# Patient Record
Sex: MALE | Race: WHITE | NOT HISPANIC OR LATINO
[De-identification: names, ages, dates, MRNs, and addresses within clinical notes are randomized per-mention and may not be internally consistent; named-entity substitution may affect disease eponyms.]

---
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
## 2018-01-19 ENCOUNTER — MEDICATION RENEWAL (OUTPATIENT)
Age: 18
End: 2018-01-19

## 2018-01-19 ENCOUNTER — RX RENEWAL (OUTPATIENT)
Age: 18
End: 2018-01-19

## 2018-02-20 ENCOUNTER — RX RENEWAL (OUTPATIENT)
Age: 18
End: 2018-02-20

## 2018-04-10 ENCOUNTER — RX RENEWAL (OUTPATIENT)
Age: 18
End: 2018-04-10

## 2018-04-11 ENCOUNTER — MEDICATION RENEWAL (OUTPATIENT)
Age: 18
End: 2018-04-11

## 2018-04-11 ENCOUNTER — APPOINTMENT (OUTPATIENT)
Dept: NEUROLOGY | Facility: CLINIC | Age: 18
End: 2018-04-11

## 2018-04-17 ENCOUNTER — MEDICATION RENEWAL (OUTPATIENT)
Age: 18
End: 2018-04-17

## 2018-04-26 ENCOUNTER — RX RENEWAL (OUTPATIENT)
Age: 18
End: 2018-04-26

## 2018-04-26 DIAGNOSIS — G40.419 OTHER GENERALIZED EPILEPSY AND EPILEPTIC SYNDROMES, INTRACTABLE, W/OUT STATUS EPILEPTICUS: ICD-10-CM

## 2018-05-01 ENCOUNTER — APPOINTMENT (OUTPATIENT)
Dept: NEUROLOGY | Facility: CLINIC | Age: 18
End: 2018-05-01
Payer: MEDICAID

## 2018-05-01 ENCOUNTER — MEDICATION RENEWAL (OUTPATIENT)
Age: 18
End: 2018-05-01

## 2018-05-01 ENCOUNTER — OTHER (OUTPATIENT)
Age: 18
End: 2018-05-01

## 2018-05-01 VITALS
BODY MASS INDEX: 25.84 KG/M2 | SYSTOLIC BLOOD PRESSURE: 115 MMHG | WEIGHT: 195 LBS | HEART RATE: 81 BPM | DIASTOLIC BLOOD PRESSURE: 63 MMHG | HEIGHT: 73 IN

## 2018-05-01 PROCEDURE — 99214 OFFICE O/P EST MOD 30 MIN: CPT

## 2018-05-01 RX ORDER — CLOBAZAM 10 MG/1
10 TABLET ORAL
Qty: 6 | Refills: 0 | Status: DISCONTINUED | COMMUNITY
Start: 2017-05-12 | End: 2018-05-01

## 2018-05-01 RX ORDER — DIVALPROEX SODIUM 500 1/1
500 TABLET, EXTENDED RELEASE ORAL
Qty: 90 | Refills: 1 | Status: DISCONTINUED | COMMUNITY
Start: 2017-05-05 | End: 2018-05-01

## 2018-06-01 ENCOUNTER — MEDICATION RENEWAL (OUTPATIENT)
Age: 18
End: 2018-06-01

## 2018-06-29 ENCOUNTER — MEDICATION RENEWAL (OUTPATIENT)
Age: 18
End: 2018-06-29

## 2018-08-02 PROBLEM — G43.509: Chronic | Status: ACTIVE | Noted: 2017-03-07

## 2018-08-02 PROBLEM — G40.909 EPILEPSY, UNSPECIFIED, NOT INTRACTABLE, WITHOUT STATUS EPILEPTICUS: Chronic | Status: ACTIVE | Noted: 2017-03-07

## 2018-08-02 PROBLEM — L70.9 ACNE, UNSPECIFIED: Chronic | Status: ACTIVE | Noted: 2017-03-07

## 2018-08-06 ENCOUNTER — APPOINTMENT (OUTPATIENT)
Dept: ORTHOPEDIC SURGERY | Facility: CLINIC | Age: 18
End: 2018-08-06

## 2019-02-18 ENCOUNTER — TRANSCRIPTION ENCOUNTER (OUTPATIENT)
Age: 19
End: 2019-02-18

## 2019-08-05 ENCOUNTER — APPOINTMENT (OUTPATIENT)
Dept: ORTHOPEDIC SURGERY | Facility: CLINIC | Age: 19
End: 2019-08-05

## 2019-11-20 ENCOUNTER — APPOINTMENT (OUTPATIENT)
Dept: NEUROLOGY | Facility: CLINIC | Age: 19
End: 2019-11-20
Payer: COMMERCIAL

## 2019-11-20 VITALS
HEIGHT: 73 IN | HEART RATE: 64 BPM | DIASTOLIC BLOOD PRESSURE: 63 MMHG | WEIGHT: 195 LBS | BODY MASS INDEX: 25.84 KG/M2 | SYSTOLIC BLOOD PRESSURE: 128 MMHG

## 2019-11-20 PROCEDURE — 99214 OFFICE O/P EST MOD 30 MIN: CPT

## 2019-11-20 RX ORDER — CLONAZEPAM 0.25 MG/1
0.25 TABLET, ORALLY DISINTEGRATING ORAL
Qty: 10 | Refills: 0 | Status: ACTIVE | COMMUNITY
Start: 2019-11-20 | End: 1900-01-01

## 2019-11-20 RX ORDER — LAMOTRIGINE 200 MG/1
200 TABLET, EXTENDED RELEASE ORAL
Qty: 60 | Refills: 5 | Status: ACTIVE | COMMUNITY
Start: 2019-11-20 | End: 1900-01-01

## 2019-11-20 RX ORDER — CLOBAZAM 10 MG/1
10 TABLET ORAL
Qty: 180 | Refills: 0 | Status: DISCONTINUED | COMMUNITY
Start: 2018-05-01 | End: 2019-11-20

## 2019-11-21 NOTE — PHYSICAL EXAM
[Oriented To Time, Place, And Person] : oriented to person, place, and time [Impaired Insight] : insight and judgment were intact [Affect] : the affect was normal [Person] : oriented to person [Place] : oriented to place [Time] : oriented to time [Concentration Intact] : normal concentrating ability [Visual Intact] : visual attention was ~T not ~L decreased [Repeating Phrases] : no difficulty repeating a phrase [Naming Objects] : no difficulty naming common objects [Writing A Sentence] : no difficulty writing a sentence [Fluency] : fluency intact [Comprehension] : comprehension intact [Reading] : reading intact [Past History] : adequate knowledge of personal past history [I: Normal Smell] : smell intact bilaterally [Cranial Nerves Optic (II)] : visual acuity intact bilaterally,  visual fields full to confrontation, pupils equal round and reactive to light [Cranial Nerves Oculomotor (III)] : extraocular motion intact [Cranial Nerves Trigeminal (V)] : facial sensation intact symmetrically [Cranial Nerves Facial (VII)] : face symmetrical [Cranial Nerves Vestibulocochlear (VIII)] : hearing was intact bilaterally [Cranial Nerves Glossopharyngeal (IX)] : tongue and palate midline [Cranial Nerves Accessory (XI - Cranial And Spinal)] : head turning and shoulder shrug symmetric [Cranial Nerves Hypoglossal (XII)] : there was no tongue deviation with protrusion [Motor Tone] : muscle tone was normal in all four extremities [Motor Strength] : muscle strength was normal in all four extremities [Involuntary Movements] : no involuntary movements were seen [No Muscle Atrophy] : normal bulk in all four extremities [Sensation Tactile Decrease] : light touch was intact [Balance] : balance was intact [2+] : Ankle jerk left 2+ [Sclera] : the sclera and conjunctiva were normal [Extraocular Movements] : extraocular movements were intact [Optic Disc Abnormality] : the optic disc were normal in size and color [PERRL With Normal Accommodation] : pupils were equal in size, round, reactive to light, with normal accommodation [Neck Appearance] : the appearance of the neck was normal [Hearing Threshold Finger Rub Not Bertie] : hearing was normal [Outer Ear] : the ears and nose were normal in appearance [Neck Cervical Mass (___cm)] : no neck mass was observed [No CVA Tenderness] : no ~M costovertebral angle tenderness [Edema] : there was no peripheral edema [Musculoskeletal - Swelling] : no joint swelling seen [Abnormal Walk] : normal gait [Skin Color & Pigmentation] : normal skin color and pigmentation [FreeTextEntry1] : MS: Awake, alert, oriented x3; no aphasia, no dysarthria, follows commands past midline.\par CN: PERRLA; no papilledema, EOMI; VFF; no facial asymmetry; V1-3; palate and tongue midline.\par Motor: 5/5  UE and LE proximally and distally\par Reflexes: 2+throughout\par Sensory: intact to  LT/PP/2x stim\par Cerebellar: no dysmetria F-N/H-S\par Toes: downgoing\par Gait/tandem: normal\par Romberg: negative.  [Span Intact] : the attention span was decreased [Past-pointing] : there was no past-pointing [Tremor] : no tremor present [Plantar Reflex Right Only] : normal on the right [Plantar Reflex Left Only] : normal on the left

## 2019-11-21 NOTE — ASSESSMENT
[FreeTextEntry1] : update: no seizures since last office visit \par inpatient VEEG March 2017 Generalized spike and wave - dc'd Keppra and started Divlaproex ER 500mg BID while in hospital\par \par 16 yo young man with hx of migraines (w/visual auras) and multiple concussions, presenting with at least two episodes of generalized seizures. Possible causes include primary (genetic) generalized epilepsy in light of father with hx of epilepsy, although rather late onset for father.  Other causes may be post concussive epilepsy in light of hx of head injury and concussion in lacrosse and football.  Normal neurological exam and MRI brain do not suggest anatomical cause (ie ischemic or mass)\par \par -vEEG in EMU setting with medication withdrawal (3-4 days) to determine if partial vs generalized  to aid in determining choice for new AED as side effects from Keppra are not tolerable.  EMU vEEG pending auth.  Patient knows risks of EMU admission.\par -Patient informed of [facility] driving regulations prohibiting driving for one year unless seizure free.  Patient and mother cautioned against activities during which loss of consciousness may result in harm to self or others, including but not limited to swimming, driving, other.

## 2019-11-21 NOTE — HISTORY OF PRESENT ILLNESS
[FreeTextEntry1] :  ***UPDATE:11/20/19\par \par Patient has been seeing Shimon Choi MD (but MD relocated to [place])\par \par Now on Lamictal 200mg BID\par He is working a s a professional \par \par Currently a student at [employer] has a 4.0 planning to switch  to [employer]\par \par Inquired about Clonazepam at night (very high strung at night and get sleep (racing thoughts)\par He has used prn in the past and has helped\par \par \par \par update: no seizures since last visit\par inpatient VEEG in March 2017 showed generalized spike and wave- Keppra was dc'd and Divalproex ER 500mg BID started while in hospital\par patient feels that he feels better on Divalproex as far as the anxiety he had on Keppra but unclear if it is helping his seizures\par \par 16 yo young man with hx of migraines (w/visual auras) and multiple concussions, presenting for new visit for seizure. Patient is accompanied by mother who provides supplementary history.   First seizure occurred last year near [facility]. While walking patient felt disoriented, light headed and sweaty and stumbled.  He does not remember walking out, however was told by his mother that he had a seizure on the driveway. He sustained a nasal and wrist fracture.  No tongue biting or incontinence. December 23, 2016 was the second episode in the setting of drinking alcohol the night prior, with normal sleep duration.  He only recalls falling down the basement stairs, and was found by his mother.  He went to the hospital in the [facility] both times.  The first time, he was prescribed Keppra 500mg BID without testing.  However he was not compliant as he noticed increased anxiety and mood swings.  The second time he went to [facility] with normal CT scan and MRI, but no EEG.  (Though he did have an routine eeg last year for workup for his migraines, which was normal). While at [facility] mother witnessed her son seizing with eye deviation upward and UE shaking.  His Keppra was increased from 500 BID to 750 BID. \par \par Furthermore, mother endorses that he has been falling out of bed.  Mother has found him on the floor in the morning twice. Patient endorse that he has a hard time falling asleep due to lots of anxiety since starting Keppra.  He He does endorse some odd odor or "weird scent" he could not quite describe a few times.  Denies any upper GI rising sensation.  He denies any staring spells. He does not swim.  He is currently not driving.  He does go to the gym with weight lifting, no treadmill, and takes protein shakes with creatine.  He does hydrate with water. He does not drink coffee. \par \par He endorses many concussions in the setting of playing lacrosse and football.\par \par He lives with his mother and sister and brother.  He has had to take a leave of absence from college due to his seizures. \par \par Patient has family hx of epilepsy-father and maternal grandmother after a MVA.  Father had first seizure at age 28.  Father also played football.  His seizures were always provoked by sleep deprivation, but he was never worked up neurologically. \par \par PMHx: none\par FHx: Father with seizures, grandmother with seizures (maternal)\par SocHx: see above\par Meds: LEV 750mg bid\par All: NKDA

## 2019-11-21 NOTE — DISCUSSION/SUMMARY
[Safety Recommendations] : The patient was advised in regards to the risk of seizures and general seizure safety recommendations including not to be bathing alone, climbing to high places and operating heavy machinery. [Risks Associated with Driving/[place] Law] : As per my usual protocol, the patient was advised in regards to risks and driving privileges associated with the [place] Guidelines.  [Compliance with Medications] : The importance of compliance with medications was reinforced. [Medication Side Effects] : High frequency and serious potential medication adverse effects were reviewed with the patient, including but not exclusive to psychiatric effects.  Information sheets on medication side effects were made available to the patient in our clinic.  The patient or advocate agrees to notify us for any concerns. [Sleep Hygiene/Sleep Disruption Risks] : Sleep hygiene and the risks of sleep disruption were discussed. [Risk of Death] : Risk of death associated with seizures / SUDEP was discussed. [EMU Consent:] : As per our usual protocol, staff discussed video EEG monitoring for the purpose of diagnosis, treatment decisions, teaching, research or publication (if de-identified).  Patient aware recording is continuous (24hrs/day), that they may be under constant observation (with exceptions), and that portions of the video EEG will be stored digitally.  In some cases AEDs may be adjusted to allow for observable seizure activity. The anticipated benefits of the study, the foreseeable risks associated with the procedure including experiencing seizures, which could be more severe than usual. The risk from seizures includes falls, fractures, muscle injury, dental injury, postictal psychiatric symptoms, and heart rate or breathing abnormalities. There will be a risk of experiencing skin irritation or breakdown from the electrodes being placed on the skin.  [FreeTextEntry1] : 17 year old with hx of numerous concussions and first seizure last year\par \par \par Plan\par Continue current AED regimen\par new script for Clonazepam 0.25 mg prn at bedtime\par  labs \par reviewed seizure triggers \par follow up in 6 months

## 2020-05-22 ENCOUNTER — APPOINTMENT (OUTPATIENT)
Dept: NEUROLOGY | Facility: CLINIC | Age: 20
End: 2020-05-22

## 2020-11-06 ENCOUNTER — APPOINTMENT (OUTPATIENT)
Dept: ORTHOPEDIC SURGERY | Facility: CLINIC | Age: 20
End: 2020-11-06

## 2021-08-11 ENCOUNTER — TRANSCRIPTION ENCOUNTER (OUTPATIENT)
Age: 21
End: 2021-08-11

## 2022-10-13 ENCOUNTER — NON-APPOINTMENT (OUTPATIENT)
Age: 22
End: 2022-10-13

## 2022-12-28 ENCOUNTER — APPOINTMENT (OUTPATIENT)
Dept: UROLOGY | Facility: CLINIC | Age: 22
End: 2022-12-28

## 2022-12-28 NOTE — ADDENDUM
[FreeTextEntry1] : This note was authored by Leslie Narvaez working as a scribe for Dr.Gary Flor. I, Dr. Andres Folr have reviewed the content of this note and confirm it is true and accurate. I personally performed the history and physical examination and made all the decisions 12/28/2022

## 2024-01-23 ENCOUNTER — NON-APPOINTMENT (OUTPATIENT)
Age: 24
End: 2024-01-23